# Patient Record
Sex: FEMALE | Race: WHITE | Employment: FULL TIME | URBAN - METROPOLITAN AREA
[De-identification: names, ages, dates, MRNs, and addresses within clinical notes are randomized per-mention and may not be internally consistent; named-entity substitution may affect disease eponyms.]

---

## 2019-04-11 ENCOUNTER — APPOINTMENT (OUTPATIENT)
Dept: MRI IMAGING | Facility: CLINIC | Age: 49
End: 2019-04-11
Attending: EMERGENCY MEDICINE
Payer: COMMERCIAL

## 2019-04-11 ENCOUNTER — HOSPITAL ENCOUNTER (EMERGENCY)
Facility: CLINIC | Age: 49
Discharge: HOME | End: 2019-04-11
Attending: EMERGENCY MEDICINE | Admitting: EMERGENCY MEDICINE
Payer: COMMERCIAL

## 2019-04-11 VITALS
HEART RATE: 64 BPM | OXYGEN SATURATION: 100 % | SYSTOLIC BLOOD PRESSURE: 116 MMHG | RESPIRATION RATE: 16 BRPM | DIASTOLIC BLOOD PRESSURE: 74 MMHG | TEMPERATURE: 98.3 F

## 2019-04-11 DIAGNOSIS — H81.10 BENIGN PAROXYSMAL POSITIONAL VERTIGO, UNSPECIFIED LATERALITY: ICD-10-CM

## 2019-04-11 DIAGNOSIS — H65.192 ACUTE MEE (MIDDLE EAR EFFUSION), LEFT: ICD-10-CM

## 2019-04-11 PROCEDURE — 70551 MRI BRAIN STEM W/O DYE: CPT

## 2019-04-11 PROCEDURE — 93005 ELECTROCARDIOGRAM TRACING: CPT

## 2019-04-11 PROCEDURE — 25000132 ZZH RX MED GY IP 250 OP 250 PS 637: Performed by: EMERGENCY MEDICINE

## 2019-04-11 PROCEDURE — 99284 EMERGENCY DEPT VISIT MOD MDM: CPT | Mod: 25

## 2019-04-11 RX ORDER — MECLIZINE HCL 12.5 MG 12.5 MG/1
12.5 TABLET ORAL 4 TIMES DAILY PRN
Qty: 30 TABLET | Refills: 0 | Status: SHIPPED | OUTPATIENT
Start: 2019-04-11

## 2019-04-11 RX ORDER — FLUTICASONE PROPIONATE 50 MCG
1 SPRAY, SUSPENSION (ML) NASAL DAILY
Qty: 15.8 ML | Refills: 0 | Status: SHIPPED | OUTPATIENT
Start: 2019-04-11

## 2019-04-11 RX ORDER — MECLIZINE HYDROCHLORIDE 25 MG/1
25 TABLET ORAL ONCE
Status: COMPLETED | OUTPATIENT
Start: 2019-04-11 | End: 2019-04-11

## 2019-04-11 RX ADMIN — MECLIZINE HYDROCHLORIDE 25 MG: 25 TABLET ORAL at 18:15

## 2019-04-11 ASSESSMENT — ENCOUNTER SYMPTOMS
DIZZINESS: 1
HEADACHES: 0
CHILLS: 1
SHORTNESS OF BREATH: 0
LIGHT-HEADEDNESS: 1
CHEST TIGHTNESS: 0

## 2019-04-11 NOTE — ED PROVIDER NOTES
History     Chief Complaint:  Dizziness    The history is provided by the patient. A  was used (american sign language).      Amaris Cronin is a 48 year old female who presents to the emergency department today with dizziness. Patient started having vomiting at work with associated dizziness for the last week. Last episode of vomiting was about 6 days ago. She continues to feel sick and not back to her baseline. She continues to have dizziness. She states the room is spinning and also like she is going to pass out. Laying down, looking up, and opening her eyes make her dizziness worse. She states she was vomiting for hours. She has had bouts of dizziness in Fall 2018 and before that was a long time ago, before she had children, but usually they resolve faster than this. She does not have a formal diagnosis of dizziness. She states she feels hot and clammy. She denies pain or pressure in her ears. She denies syncope, headache, gait problem. She denies chest pain, chest pressure, difficulty breathing. Patient took Advil, which did not help. Patient stopped her Tapazole 6 days ago, but started again yesterday.     Allergies:  Augmentin     Medications:    Tapazole    Past Medical History:    Grave's disease   Hyperthyroidism  Deaf  Dysplasia of cervix   Congenital hearing loss  TB  LGSIL  ASCUS     Past Surgical History:    Breast biopsy  Colposcopy     Family History:    Heart disease  Colon cancer  Diabetes   Stroke  Breast cancer     Social History:  The patient was alone.  Smoking Status: Never  Smokeless Tobacco: Never  Alcohol Use: No    Marital Status:      Review of Systems   Constitutional: Positive for chills.   HENT: Negative for ear pain.    Respiratory: Negative for chest tightness and shortness of breath.    Cardiovascular: Negative for chest pain.   Musculoskeletal: Negative for gait problem.   Neurological: Positive for dizziness and light-headedness. Negative for syncope  and headaches.   All other systems reviewed and are negative.      Physical Exam     Patient Vitals for the past 24 hrs:   BP Temp Temp src Pulse Heart Rate Resp SpO2   04/11/19 2009 116/74 -- -- 64 -- 16 100 %   04/11/19 1809 130/88 98.3  F (36.8  C) Oral 58 58 16 97 %      Physical Exam  Constitutional: Alert, attentive, GCS 15  HENT:    Nose: Nose normal.    Mouth/Throat: Oropharynx is clear, mucous membranes are moist  Eyes: EOM are normal, anicteric, conjugate gaze  CV: regular rate and rhythm; no murmurs  Chest: Effort normal and breath sounds clear without wheezing or rales, symmetric bilaterally   GI:  non tender. No distension. No guarding or rebound.    MSK: No LE edema, no tenderness to palpation of BLE.  Neurological: Neurological:   GCS 15; A/Ox3; Cranial nerves 2-12 intact;   5/5 strength throughout the upper and lower extremities;   sensation intact to light touch throughout the upper and lower extremities;   2+ DTRs to the bilateral upper and lower extremities (biceps, BRs, patellar, achilles);   normal fine motor coordination intact bilaterally;   normal gait   Skin: Skin is warm and dry.    Emergency Department Course   ECG:  Indication: Dizziness  Completed at 1827.  Read at 1827.   Sinus bradycardia   Otherwise Normal ECG    Rate 51 bpm. WY interval 156. QRS duration 98. QT/QTc 408/376. P-R-T axes 46 -4 8.     Imaging:  Radiology findings were communicated with the patient who voiced understanding of the findings.  MR Brain w/o Contrast   Final Result   IMPRESSION:   Normal MRI of the brain.         PHIL ERICKSON MD      Report per radiology      Interventions:  1815: Antivert 25 mg PO     Emergency Department Course:  Nursing notes and vitals reviewed.  1750: I performed an exam of the patient as documented above.   The patient was sent for a MR Brain while in the emergency department, results above.   1957: Findings and plan explained to the Patient. Patient discharged home with instructions  regarding supportive care, medications, and reasons to return. The importance of close follow-up was reviewed. The patient was prescribed Antivert and Flonase.   I personally reviewed the imaging results with the Patient and answered all related questions prior to discharge.      Impression & Plan    Medical Decision Making:  Amaris Cronin is a 48 year old female with past medical history notable for deafness who presents for evaluation of now 6 days of intermittent vertiginous symptoms without associated focal neurologic deficits including gait instability or vision changes. However, she does report some hot flashes, though this is in the setting of cessation of her medications for hyperthyroidism with plan for ablation tomorrow. On exam she is noted to have some horizontal nystagmus, but no other focal neural deficits and her history has known suggestion of likely positional vertigo including BPPV, as she has no symptoms of oral fullness, tenderness, however, I did elect to proceed with MRI given her deafness and to assess for possible tumor or likely acoustic neuroma. Fortunately this was negative for both mass and stroke. Her symptoms were improved with meclizine and she was referred to physical therapy, her PCP, and started on meclizine for likely BPPV.      Diagnosis:    ICD-10-CM    1. Benign paroxysmal positional vertigo, unspecified laterality H81.10    2. Acute EDMOND (middle ear effusion), left H65.192        Disposition:  discharged to home    Discharge Medications:     Medication List      Started    fluticasone 50 MCG/ACT nasal spray  Commonly known as:  FLONASE  1 spray, Both Nostrils, DAILY     meclizine 12.5 MG tablet  Commonly known as:  ANTIVERT  12.5 mg, Oral, 4 TIMES DAILY PRN            Xander Page MD   [employer]  10:26 PM 04/11/19     Scribe Disclosure:  Daysi POLK, am serving as a scribe at 5:58 PM on 4/11/2019 to document services personally  performed by Xander Page MD based on my observations and the provider's statements to me.    4/11/2019   [facility] EMERGENCY DEPARTMENT       Xander Page MD  04/11/19 1625

## 2019-04-11 NOTE — ED AVS SNAPSHOT
[facility] Emergency Department  [address]  [facility] [address]  Phone:  [phone]  Fax:  [phone]                                    Amaris Cronin   MRN: [id number]    Department:  [facility] Emergency Department   Date of Visit:  4/11/2019           After Visit Summary Signature Page    I have received my discharge instructions, and my questions have been answered. I have discussed any challenges I see with this plan with the nurse or doctor.    ..........................................................................................................................................  Patient/Patient Representative Signature      ..........................................................................................................................................  Patient Representative Print Name and Relationship to Patient    ..................................................               ................................................  Date                                   Time    ..........................................................................................................................................  Reviewed by Signature/Title    ...................................................              ..............................................  Date                                               Time          22EPIC Rev 08/18

## 2019-04-12 LAB — INTERPRETATION ECG - MUSE: NORMAL

## 2019-04-12 NOTE — DISCHARGE INSTRUCTIONS
Discharge Instructions  Vertigo  You have been diagnosed with vertigo.  This is a dizzy feeling often described as spinning or that the room is moving around you. You will often have nausea (sick to your stomach), vomiting (throwing up), and balance problems with it.  Vertigo is usually caused by a problem in the inner ear which helps control your balance.  Many things can cause vertigo, including calcium collections in the inner ear, a virus infection of the inner ear, concussion, migraine, and some medicines.  Luckily, these causes are not life threatening and will eventually go away.  However, sometimes there is a serious problem that does not show up right away.  Generally, every Emergency Department visit should have a follow-up clinic visit with either a primary or a specialty clinic/provider. Please follow-up as instructed by your emergency provider today.  Return to the Emergency Department if you have:  New or severe headache.  Double vision (seeing two of things).  Trouble speaking or hearing.  Weakness or trouble moving/using one side of your body.  Passing out.  Numbness or tingling.  Chest pain.  Vomiting that will not stop.    Treatment:  There are several commonly prescribed medications:  Antihistamines such as meclizine (Antivert ), dimenhydrinate (Dramamine ), or diphenhydramine (Benadryl ).  Prescription anti-nausea medicines, such as promethazine (Phenergan ), metoclopramide (Reglan ), or ondansetron (Zofran ).  Prescription sedative medicines, such as diazepam (Valium ), lorazepam (Ativan ), or clonazepam (Klonopin ).  Most of these medicines make you sleepy, and you should not take them before you work or drive. You should only take prescription medicines to treat severe vertigo symptoms, and you should stop the medicine when your symptoms improve.    Follow Up:  If you have vertigo longer than three days, it is important that you follow up either with your primary provider or an Ear, Nose, and  Throat (ENT) specialist.  You may need further testing to evaluate your vertigo and you may also need ?vestibular? therapy which is a special form of physical therapy to make the vertigo go away.    If you were given a prescription for medicine here today, be sure to read all of the information (including the package insert) that comes with your prescription.  This will include important information about the medicine, its side effects, and any warnings that you need to know about.  The pharmacist who fills the prescription can provide more information and answer questions you may have about the medicine.  If you have questions or concerns that the pharmacist cannot address, please call or return to the Emergency Department.     Remember that you can always come back to the Emergency Department if you are not able to see your regular provider in the amount of time listed above, if you get any new symptoms, or if there is anything that worries you.

## 2019-04-12 NOTE — ED NOTES
Pt verbalizes understanding of discharge plan, follow up and s/s to return to ER for. Pt discharge ambulating well